# Patient Record
Sex: FEMALE | URBAN - METROPOLITAN AREA
[De-identification: names, ages, dates, MRNs, and addresses within clinical notes are randomized per-mention and may not be internally consistent; named-entity substitution may affect disease eponyms.]

---

## 2021-03-23 ENCOUNTER — APPOINTMENT (RX ONLY)
Dept: URBAN - METROPOLITAN AREA CLINIC 164 | Facility: CLINIC | Age: 71
Setting detail: DERMATOLOGY
End: 2021-03-23

## 2021-03-23 DIAGNOSIS — D18.0 HEMANGIOMA: ICD-10-CM

## 2021-03-23 DIAGNOSIS — L57.0 ACTINIC KERATOSIS: ICD-10-CM

## 2021-03-23 DIAGNOSIS — Q82.5 CONGENITAL NON-NEOPLASTIC NEVUS: ICD-10-CM

## 2021-03-23 DIAGNOSIS — L70.8 OTHER ACNE: ICD-10-CM

## 2021-03-23 DIAGNOSIS — L81.5 LEUKODERMA, NOT ELSEWHERE CLASSIFIED: ICD-10-CM

## 2021-03-23 DIAGNOSIS — L57.8 OTHER SKIN CHANGES DUE TO CHRONIC EXPOSURE TO NONIONIZING RADIATION: ICD-10-CM

## 2021-03-23 DIAGNOSIS — L81.4 OTHER MELANIN HYPERPIGMENTATION: ICD-10-CM

## 2021-03-23 DIAGNOSIS — L82.1 OTHER SEBORRHEIC KERATOSIS: ICD-10-CM

## 2021-03-23 PROBLEM — D18.01 HEMANGIOMA OF SKIN AND SUBCUTANEOUS TISSUE: Status: ACTIVE | Noted: 2021-03-23

## 2021-03-23 PROBLEM — D23.71 OTHER BENIGN NEOPLASM OF SKIN OF RIGHT LOWER LIMB, INCLUDING HIP: Status: ACTIVE | Noted: 2021-03-23

## 2021-03-23 PROCEDURE — ? SUNSCREEN RECOMMENDATIONS

## 2021-03-23 PROCEDURE — ? COUNSELING

## 2021-03-23 PROCEDURE — 99203 OFFICE O/P NEW LOW 30 MIN: CPT | Mod: 25

## 2021-03-23 PROCEDURE — ? LIQUID NITROGEN

## 2021-03-23 PROCEDURE — 17003 DESTRUCT PREMALG LES 2-14: CPT

## 2021-03-23 PROCEDURE — 17000 DESTRUCT PREMALG LESION: CPT

## 2021-03-23 ASSESSMENT — LOCATION DETAILED DESCRIPTION DERM
LOCATION DETAILED: LEFT DISTAL DORSAL FOREARM
LOCATION DETAILED: UPPER STERNUM
LOCATION DETAILED: SUPERIOR THORACIC SPINE
LOCATION DETAILED: RIGHT RIB CAGE
LOCATION DETAILED: RIGHT PROXIMAL POSTERIOR UPPER ARM
LOCATION DETAILED: LEFT RIB CAGE
LOCATION DETAILED: LEFT LATERAL FOREHEAD
LOCATION DETAILED: LEFT INFERIOR LATERAL MALAR CHEEK
LOCATION DETAILED: LOWER STERNUM
LOCATION DETAILED: RIGHT DISTAL RADIAL DORSAL FOREARM
LOCATION DETAILED: RIGHT LATERAL FOREHEAD
LOCATION DETAILED: MID-OCCIPITAL SCALP
LOCATION DETAILED: RIGHT DISTAL PRETIBIAL REGION
LOCATION DETAILED: RIGHT LATERAL ABDOMEN
LOCATION DETAILED: SUPERIOR MID FOREHEAD
LOCATION DETAILED: LEFT POSTERIOR SHOULDER
LOCATION DETAILED: LEFT INFERIOR ANTERIOR NECK
LOCATION DETAILED: RIGHT INFERIOR CENTRAL MALAR CHEEK
LOCATION DETAILED: LEFT LATERAL ABDOMEN
LOCATION DETAILED: RIGHT POSTERIOR SHOULDER
LOCATION DETAILED: PERIUMBILICAL SKIN
LOCATION DETAILED: LEFT DISTAL PRETIBIAL REGION
LOCATION DETAILED: LEFT PROXIMAL POSTERIOR UPPER ARM
LOCATION DETAILED: LEFT INFERIOR CENTRAL MALAR CHEEK
LOCATION DETAILED: RIGHT DISTAL DORSAL FOREARM
LOCATION DETAILED: INFERIOR THORACIC SPINE

## 2021-03-23 ASSESSMENT — LOCATION ZONE DERM
LOCATION ZONE: ARM
LOCATION ZONE: FACE
LOCATION ZONE: LEG
LOCATION ZONE: NECK
LOCATION ZONE: SCALP
LOCATION ZONE: TRUNK

## 2021-03-23 ASSESSMENT — LOCATION SIMPLE DESCRIPTION DERM
LOCATION SIMPLE: LEFT CHEEK
LOCATION SIMPLE: LEFT PRETIBIAL REGION
LOCATION SIMPLE: LEFT SHOULDER
LOCATION SIMPLE: RIGHT SHOULDER
LOCATION SIMPLE: POSTERIOR SCALP
LOCATION SIMPLE: LEFT FOREHEAD
LOCATION SIMPLE: LEFT ANTERIOR NECK
LOCATION SIMPLE: RIGHT CHEEK
LOCATION SIMPLE: RIGHT FOREARM
LOCATION SIMPLE: RIGHT POSTERIOR UPPER ARM
LOCATION SIMPLE: LEFT FOREARM
LOCATION SIMPLE: ABDOMEN
LOCATION SIMPLE: RIGHT PRETIBIAL REGION
LOCATION SIMPLE: CHEST
LOCATION SIMPLE: LEFT POSTERIOR UPPER ARM
LOCATION SIMPLE: SUPERIOR FOREHEAD
LOCATION SIMPLE: UPPER BACK
LOCATION SIMPLE: RIGHT FOREHEAD

## 2024-08-28 ENCOUNTER — APPOINTMENT (RX ONLY)
Dept: URBAN - METROPOLITAN AREA CLINIC 164 | Facility: CLINIC | Age: 74
Setting detail: DERMATOLOGY
End: 2024-08-28

## 2024-08-28 DIAGNOSIS — Z41.9 ENCOUNTER FOR PROCEDURE FOR PURPOSES OTHER THAN REMEDYING HEALTH STATE, UNSPECIFIED: ICD-10-CM

## 2024-08-28 DIAGNOSIS — L81.4 OTHER MELANIN HYPERPIGMENTATION: ICD-10-CM

## 2024-08-28 DIAGNOSIS — Z12.83 ENCOUNTER FOR SCREENING FOR MALIGNANT NEOPLASM OF SKIN: ICD-10-CM

## 2024-08-28 DIAGNOSIS — D22 MELANOCYTIC NEVI: ICD-10-CM

## 2024-08-28 DIAGNOSIS — D18.0 HEMANGIOMA: ICD-10-CM

## 2024-08-28 DIAGNOSIS — L57.0 ACTINIC KERATOSIS: ICD-10-CM

## 2024-08-28 DIAGNOSIS — L82.1 OTHER SEBORRHEIC KERATOSIS: ICD-10-CM

## 2024-08-28 PROBLEM — D18.01 HEMANGIOMA OF SKIN AND SUBCUTANEOUS TISSUE: Status: ACTIVE | Noted: 2024-08-28

## 2024-08-28 PROBLEM — D22.5 MELANOCYTIC NEVI OF TRUNK: Status: ACTIVE | Noted: 2024-08-28

## 2024-08-28 PROCEDURE — ? COSMETIC CONSULTATION - MICRO-NEEDLING

## 2024-08-28 PROCEDURE — 17003 DESTRUCT PREMALG LES 2-14: CPT

## 2024-08-28 PROCEDURE — ? ADDITIONAL NOTES

## 2024-08-28 PROCEDURE — ? LIQUID NITROGEN

## 2024-08-28 PROCEDURE — ? COUNSELING

## 2024-08-28 PROCEDURE — 99203 OFFICE O/P NEW LOW 30 MIN: CPT | Mod: 25

## 2024-08-28 PROCEDURE — ? COSMETIC CONSULTATION: PRODUCTS

## 2024-08-28 PROCEDURE — ? COSMETIC CONSULTATION: HYDRAFACIAL

## 2024-08-28 PROCEDURE — ? COSMETIC CONSULTATION: IPL

## 2024-08-28 PROCEDURE — ? FULL BODY SKIN EXAM

## 2024-08-28 PROCEDURE — ? COSMETIC QUOTE

## 2024-08-28 PROCEDURE — 17000 DESTRUCT PREMALG LESION: CPT

## 2024-08-28 ASSESSMENT — LOCATION DETAILED DESCRIPTION DERM
LOCATION DETAILED: RIGHT SUPERIOR FOREHEAD
LOCATION DETAILED: PERIUMBILICAL SKIN
LOCATION DETAILED: LEFT MID-UPPER BACK
LOCATION DETAILED: RIGHT SUPERIOR UPPER BACK
LOCATION DETAILED: EPIGASTRIC SKIN
LOCATION DETAILED: MIDDLE STERNUM
LOCATION DETAILED: RIGHT MID-UPPER BACK

## 2024-08-28 ASSESSMENT — LOCATION SIMPLE DESCRIPTION DERM
LOCATION SIMPLE: RIGHT FOREHEAD
LOCATION SIMPLE: LEFT UPPER BACK
LOCATION SIMPLE: RIGHT UPPER BACK
LOCATION SIMPLE: CHEST
LOCATION SIMPLE: ABDOMEN

## 2024-08-28 ASSESSMENT — LOCATION ZONE DERM
LOCATION ZONE: FACE
LOCATION ZONE: TRUNK

## 2024-08-28 NOTE — PROCEDURE: ADDITIONAL NOTES
Additional Notes: Patient came in for a cosmetic consult. Patients concerns are wrinkles, loss of skin laxity & brown spots. I recommended monthly Hydrafacials & we also consulted on the benefits of micro-needling. We also consulted on chemical peels but patient felt that treatment does not fit her lifestyle. I then explained having a skin routine is essential & La Roche Posay products were recommended. During our consult patient also tried to purchase the GC promo. However, it did not work so I will honor her first HF deluxe at $199. \\n\\nPatient was pleased with consult & stated she would call back to schedule.
Detail Level: Zone
Render Risk Assessment In Note?: no

## 2024-08-28 NOTE — PROCEDURE: LIQUID NITROGEN
Render Note In Bullet Format When Appropriate: No
Detail Level: Detailed
Show Aperture Variable?: Yes
Number Of Freeze-Thaw Cycles: 1 freeze-thaw cycle
Duration Of Freeze Thaw-Cycle (Seconds): 4
Post-Care Instructions: I reviewed with the patient in detail post-care instructions. Patient is to wear sunprotection, and avoid picking at any of the treated lesions. Pt may apply Vaseline to crusted or scabbing areas.
Consent: The patient's consent was obtained including but not limited to risks of crusting, scabbing, blistering, scarring, darker or lighter pigmentary change, recurrence, incomplete removal and infection.

## 2024-08-28 NOTE — PROCEDURE: COSMETIC QUOTE
Breast Procedure 6 Free Text Discount (In Dollars- Use Only Numbers And Decimals): 0.00
Body Procedure 3: HydraFacial (Back)
Injectable 1 Units: 0
Face Procedure 8 Price/Unit (In Dollars- Use Only Numbers And Decimals): 389
Include Sales Tax On Surgeon's Fees: Yes
Body Procedure 7 Price/Unit (In Dollars- Use Only Numbers And Decimals): 350
Face Procedure 9: Sol RF micro-needling
Face Procedure 6 Price/Unit (In Dollars- Use Only Numbers And Decimals): 369
Face Procedure 3 Price/Unit (In Dollars- Use Only Numbers And Decimals): 199
Include Sales Tax On Facility Fees: No
Body Procedure 4 Price/Unit (In Dollars- Use Only Numbers And Decimals): 150
Body Procedure 1 Price/Unit (In Dollars- Use Only Numbers And Decimals): 299
Laser 1: IPL
Body Procedure 5 Price/Unit (In Dollars- Use Only Numbers And Decimals): 499
Face Procedure 4 Price/Unit (In Dollars- Use Only Numbers And Decimals): 265
Body Procedure 2 Price/Unit (In Dollars- Use Only Numbers And Decimals): 450
Face Procedure 2: Dermaplane add on
Face Procedure 5: HydraFacial (Platinum)
Body Procedure 6: Hydrafacial Neck & Chest add on
Misc Procedure 1: HydraFacial Keravive
Face Procedure 5 Price/Unit (In Dollars- Use Only Numbers And Decimals): 329
Body Procedure 6 Price/Unit (In Dollars- Use Only Numbers And Decimals): 75
Face Procedure 2 Price/Unit (In Dollars- Use Only Numbers And Decimals): 50
Face Procedure 10 Units: 1
Face Procedure 6: VI Peel (Advanced)
Face Procedure 3: HydraFacial (Signature)
Body Procedure 4: SkinPen
Body Procedure 1: VI Peel Body (small area)
Face Procedure 7: VI Peel (Precision Plus)
Face Procedure 1: Dermaplane Facial
Face Procedure 4: HydraFacial (Deluxe)
Body Procedure 5: Keravive (HydraFacial)
Body Procedure 2: VI Peel Body (large area)
Detail Level: Zone
Face Procedure 8: VI Peel (Purify w/ Precision Plus)